# Patient Record
Sex: FEMALE | Race: BLACK OR AFRICAN AMERICAN | Employment: FULL TIME | URBAN - METROPOLITAN AREA
[De-identification: names, ages, dates, MRNs, and addresses within clinical notes are randomized per-mention and may not be internally consistent; named-entity substitution may affect disease eponyms.]

---

## 2017-03-11 ENCOUNTER — HOSPITAL ENCOUNTER (EMERGENCY)
Facility: HOSPITAL | Age: 37
Discharge: HOME | End: 2017-03-11
Attending: EMERGENCY MEDICINE
Payer: COMMERCIAL

## 2017-03-11 VITALS
WEIGHT: 210 LBS | RESPIRATION RATE: 18 BRPM | SYSTOLIC BLOOD PRESSURE: 148 MMHG | TEMPERATURE: 98 F | BODY MASS INDEX: 42.33 KG/M2 | OXYGEN SATURATION: 100 % | HEIGHT: 59 IN | DIASTOLIC BLOOD PRESSURE: 93 MMHG | HEART RATE: 87 BPM

## 2017-03-11 DIAGNOSIS — J20.9 ACUTE BRONCHITIS, UNSPECIFIED ORGANISM: Primary | ICD-10-CM

## 2017-03-11 PROCEDURE — 94640 AIRWAY INHALATION TREATMENT: CPT

## 2017-03-11 PROCEDURE — 99283 EMERGENCY DEPT VISIT LOW MDM: CPT

## 2017-03-11 PROCEDURE — 99284 EMERGENCY DEPT VISIT MOD MDM: CPT

## 2017-03-11 RX ORDER — PREDNISONE 20 MG/1
40 TABLET ORAL DAILY
Qty: 8 TABLET | Refills: 0 | Status: SHIPPED | OUTPATIENT
Start: 2017-03-11 | End: 2017-03-15

## 2017-03-11 RX ORDER — PREDNISONE 20 MG/1
60 TABLET ORAL ONCE
Status: COMPLETED | OUTPATIENT
Start: 2017-03-11 | End: 2017-03-11

## 2017-03-11 RX ORDER — CODEINE PHOSPHATE AND GUAIFENESIN 10; 100 MG/5ML; MG/5ML
5 SOLUTION ORAL EVERY 6 HOURS PRN
Qty: 100 ML | Refills: 0 | Status: SHIPPED | OUTPATIENT
Start: 2017-03-11 | End: 2017-03-16

## 2017-03-11 RX ORDER — ALBUTEROL SULFATE 90 MCG
2 HFA AEROSOL WITH ADAPTER (GRAM) INHALATION EVERY 6 HOURS PRN
Qty: 1 INHALER | Refills: 0 | Status: SHIPPED | OUTPATIENT
Start: 2017-03-11 | End: 2018-03-11

## 2017-03-11 RX ORDER — IPRATROPIUM BROMIDE AND ALBUTEROL SULFATE 2.5; .5 MG/3ML; MG/3ML
3 SOLUTION RESPIRATORY (INHALATION) ONCE
Status: COMPLETED | OUTPATIENT
Start: 2017-03-11 | End: 2017-03-11

## 2017-03-12 NOTE — ED PROVIDER NOTES
Patient Seen in: [facility] Emergency Department    History   Patient presents with:  Sore Throat    Stated Complaint: sore throat     HPI    Patient is a 41-year-old female comes in to emergency room with upper respiratory symptoms.   Patient had a Pulse 87  Temp(Src) 97.6 °F (36.4 °C) (Temporal)  Resp 18  Ht 149.9 cm (4' 11\")  Wt 95.255 kg  BMI 42.39 kg/m2  SpO2 100%  LMP 02/25/2017        Physical Exam    GENERAL: No acute distress, well appearing and non-toxic, Alert and oriented X 3   HEENT: Nor or concerns. Patient felt comfortable going home. Disposition and Plan     Clinical Impression:  Acute bronchitis, unspecified organism  (primary encounter diagnosis)    Disposition:  There is no disposition on file for this visit.     Follow-up

## (undated) NOTE — ED AVS SNAPSHOT
[facility] Emergency Department    [address] [place] [address]    Phone:  [phone]    Fax:  [phone]           Ac Zonia   MRN: [id number]    Department:  [facility] Emergency Department   Date of Visit:  3/1 IF THERE IS ANY CHANGE OR WORSENING OF YOUR CONDITION, CALL YOUR PRIMARY CARE PHYSICIAN AT ONCE OR RETURN IMMEDIATELY TO THE EMERGENCY DEPARTMENT.     If you have been prescribed any medication(s), please fill your prescription right away and begin taking t

## (undated) NOTE — ED AVS SNAPSHOT
[facility] Emergency Department    [place]  [address]    [address] [place] [address]    Phone:  [phone]    Fax:  [phone]           Nazia Diaz   MRN: [id number]    Department:  [facility] Emergency Department   Date of Visit:  3/1 - guaiFENesin-codeine 100-10 MG/5ML Soln  - predniSONE 20 MG Tabs  - PROVENTIL  (90 Base) MCG/ACT Aers              Discharge Instructions       Use albuterol inhaler as needed for shortness of breath, wheezing or cough    Cheratussin for cough at primary care or specialist physician will see patients referred from the [facility] Emergency Department. Follow-up care is at the discretion of that Physician.     IF THERE IS ANY CHANGE OR WORSENING OF YOUR CONDITION, CALL YOUR PRIMARY CARE PHYSICIAN harming yourself, contact [facility] at [phone]. - If you don’t have insurance, Marli Jimenez has partnered with [facility] to help you get signed up for insurance coverage.   [facility]